# Patient Record
Sex: FEMALE
[De-identification: names, ages, dates, MRNs, and addresses within clinical notes are randomized per-mention and may not be internally consistent; named-entity substitution may affect disease eponyms.]

---

## 2023-03-09 ENCOUNTER — NURSE TRIAGE (OUTPATIENT)
Dept: OTHER | Facility: CLINIC | Age: 23
End: 2023-03-09

## 2023-03-09 NOTE — TELEPHONE ENCOUNTER
Received call from [place] [facility] Name: Alma Bell MRN: [id number]    Subjective: Caller states \"Vaginal bleeding @ 38 weeks and 3 days pregnant. Bleeding when I wipe. \"     DARIO 3/20/23     Current Symptoms: bright red bleeding when I wipe, cramping, decreased movement (last felt baby at 2000)     Onset: since waking up at 2000    Associated Symptoms: NA    Pain Severity: 7/10; cramping; intermittent    What has been tried: nothing     What makes it better or worse: NA    Triage indicates for patient to  L&D NOW    Care advice provided, patient verbalizes understanding; denies any other questions or concerns; instructed to call back for any new or worsening symptoms.     L&D NOW    This triage is a result of a call to the [address] [place] [address]    Reason for Disposition   Baby moving less today (e.g., kick count < 5 in 1 hour or < 10 in 2 hours)    Protocols used: Pregnancy - Vaginal Bleeding Greater Than 20 Weeks [id number]